# Patient Record
Sex: MALE | Race: WHITE | Employment: OTHER | URBAN - METROPOLITAN AREA
[De-identification: names, ages, dates, MRNs, and addresses within clinical notes are randomized per-mention and may not be internally consistent; named-entity substitution may affect disease eponyms.]

---

## 2022-01-18 ENCOUNTER — MEDICAL CORRESPONDENCE (OUTPATIENT)
Dept: HEALTH INFORMATION MANAGEMENT | Facility: CLINIC | Age: 52
End: 2022-01-18
Payer: COMMERCIAL

## 2022-01-19 ENCOUNTER — TRANSCRIBE ORDERS (OUTPATIENT)
Dept: OTHER | Age: 52
End: 2022-01-19

## 2022-01-19 DIAGNOSIS — Q01.9 ENCEPHALOCELE (H): Primary | ICD-10-CM

## 2022-01-19 DIAGNOSIS — H91.90 HEARING LOSS: Primary | ICD-10-CM

## 2022-01-19 DIAGNOSIS — Q01.9 ENCEPHALOCELE (H): ICD-10-CM

## 2022-01-19 NOTE — TELEPHONE ENCOUNTER
FUTURE VISIT INFORMATION      FUTURE VISIT INFORMATION:    Date: 3/2/22    Time: 10AM    Location: [facility] [place]  REFERRAL INFORMATION:    Referring provider:  Lory De La Paz PA      Referring providers clinic:  ENT [facility]      Reason for visit/diagnosis  WIN prior, Hearing loss per Pt, Ref by Nahomi Zaragoza MD in [facility]    RECORDS REQUESTED FROM:       Clinic name Comments Records Status Imaging Status   ENT [facility]   9/7/21 note from Yasir Dunaway MD    7/16/21 LEFT TYMPANOMASTOIDECTOMY  9/18/2020 LEFT TYMPANOMASTOIDECTOMY   Care everywhere     Audiology    [facility]       9/7/21 audiogram   01/05/2021 Audiogram  11/24/2020 audiogram    3/5/2020 audiogram  Scanned in Care everywhere encounter     [facility]  12/21/21 CT Temporal bone   3/11/21 CT Temporal bone   3/11/2020 CT Temporal bone  Care everywhere  PACS

## 2022-01-21 ENCOUNTER — MYC MEDICAL ADVICE (OUTPATIENT)
Dept: OTOLARYNGOLOGY | Facility: CLINIC | Age: 52
End: 2022-01-21
Payer: COMMERCIAL

## 2022-01-24 DIAGNOSIS — F40.240 CLAUSTROPHOBIA: Primary | ICD-10-CM

## 2022-01-24 RX ORDER — DIAZEPAM 2 MG
2-4 TABLET ORAL
Qty: 2 TABLET | Refills: 0 | Status: SHIPPED | OUTPATIENT
Start: 2022-01-24

## 2022-01-24 NOTE — TELEPHONE ENCOUNTER
Spoke with pt about MRI and his claustrophobia. Recommendations for valium and open-MRI. He states he has done these in the past and still doesn't love the experience, but is willing to try it once more. Reviewed needing a  after this appointment, and taking the valium once he has arrived at the center. Writer will send MRI order to [facility] [place], then they will reach out to him to schedule. Writer to update MD for valium script. Pt agrees with plan and no further questions.

## 2022-01-25 NOTE — ADDENDUM NOTE
Addended by: FARZAD URIAS on: 1/25/2022 05:30 PM     Modules accepted: Orders     Orthopaedic Progress Note      SUBJECTIVE   Ms. Jessica Mejia is post op day # 3     Patient s/p left total hip arthroplasty. Patient seen resting comfortably in bed eating her lunch. States pain is improving. No new concerns. Hoping to go home today with her daughter       OBJECTIVE      Physical    VITALS:  BP (!) 125/96   Pulse 101   Temp 99.1 °F (37.3 °C) (Oral)   Resp 16   Ht 5' 2\" (1.575 m)   Wt 130 lb 8.2 oz (59.2 kg)   SpO2 94%   BMI 23.87 kg/m²   I/O last 3 completed shifts: In: 2333.6 [P.O.:690; I.V.:1643.6]  Out: 2750 [Urine:2750]      LLE:  Prinio dressing in place with no signs of erythema, infections, or drainage noted. ROM knee, ankle, toes intact without difficulty or pain. Calf soft nontender to palpation. NVI, no numbness/tingling. Intact dorsalis pedal pulse and posterior tibialis pulse.           Data  CBC:   Lab Results   Component Value Date    WBC 11.0 06/23/2019    HGB 8.5 06/23/2019     06/23/2019     BMP:    Lab Results   Component Value Date     06/23/2019    K 4.3 06/23/2019     06/23/2019    CO2 24 06/23/2019    BUN 13 06/23/2019    CREATININE 0.7 06/23/2019    CALCIUM 8.9 06/23/2019    GLUCOSE 108 06/23/2019     Uric Acid:  No components found for: URIC  PT/INR:  No results found for: PROTIME, INR  Troponin:  No results found for: TROPONINI  Urine Culture:  No components found for: CURINE      Current Inpatient Medications    Current Facility-Administered Medications: 0.9 % sodium chloride infusion, , Intravenous, Continuous  morphine (PF) injection 2 mg, 2 mg, Intravenous, Q2H PRN **OR** morphine (PF) injection 4 mg, 4 mg, Intravenous, Q2H PRN  HYDROcodone-acetaminophen (NORCO) 5-325 MG per tablet 1 tablet, 1 tablet, Oral, Q4H PRN **OR** HYDROcodone-acetaminophen (NORCO) 5-325 MG per tablet 2 tablet, 2 tablet, Oral, Q4H PRN  cyclobenzaprine (FLEXERIL) tablet 10 mg, 10 mg, Oral, TID PRN  ondansetron (ZOFRAN) injection 4 mg, 4 mg, Intravenous, Q6H PRN  rivaroxaban (XARELTO) tablet 10 mg, 10 mg, Oral, Daily  docusate sodium (COLACE) capsule 100 mg, 100 mg, Oral, Daily  magnesium hydroxide (MILK OF MAGNESIA) 400 MG/5ML suspension 30 mL, 30 mL, Oral, Daily PRN  acetaminophen (TYLENOL) tablet 650 mg, 650 mg, Oral, Q4H PRN  amLODIPine (NORVASC) tablet 5 mg, 5 mg, Oral, Daily  atorvastatin (LIPITOR) tablet 80 mg, 80 mg, Oral, Nightly  carvedilol (COREG) tablet 6.25 mg, 6.25 mg, Oral, BID WC  levothyroxine (SYNTHROID) tablet 75 mcg, 75 mcg, Oral, Daily  linaCLOtide (LINZESS) capsule 72 mcg, 72 mcg, Oral, QAM AC  losartan (COZAAR) tablet 50 mg, 50 mg, Oral, BID  omega-3 acid ethyl esters (LOVAZA) capsule 1,000 mg, 1,000 mg, Oral, Daily  oxybutynin (DITROPAN-XL) extended release tablet 15 mg, 15 mg, Oral, Daily  pantoprazole (PROTONIX) tablet 40 mg, 40 mg, Oral, BID  PARoxetine (PAXIL) tablet 20 mg, 20 mg, Oral, QAM  polyethylene glycol (GLYCOLAX) packet 17 g, 17 g, Oral, Daily PRN  predniSONE (DELTASONE) tablet 5 mg, 5 mg, Oral, BID  rOPINIRole (REQUIP) tablet 2 mg, 2 mg, Oral, Nightly  traZODone (DESYREL) tablet 200 mg, 200 mg, Oral, Nightly        PLAN    1)  Cont with current medical management  2)  WBAT LLE, activity as tolerated  3)  Dry dressing changes PRN  4)  Plan on discharge to home today, f/u in office in 8 weeks    Rick Rodriguez PA-C

## 2022-02-06 ENCOUNTER — HEALTH MAINTENANCE LETTER (OUTPATIENT)
Age: 52
End: 2022-02-06

## 2022-02-09 NOTE — PROGRESS NOTES
Jam Luu is seen in consultation in the [facility] lateral skullbase clinic from Dr. Dunaway of [facility].  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 The patient underwent left tympanomastoidectomy by Dr. Yasir Dunaway 1.5 years ago (09/18/2020) and again a left tympanomastoidectomy 7 months ago (07/16/2021) for cholesteatoma which was noted at each surgery. At the last surgery, an encephalocele was encountered and the tegmen defect closed with cartilage. However, postoperative imaging showed a likely continued encephalocele.     Today, the patient reports no clear drainage from his nose and no drainage from the ear. No vertigo. No otalgia. Hearing is down on the left.    PMHx: Morbid obesity, asthma, depression    PSHx: Otologic history as above    Social History     Tobacco Use     Smoking status: Never Smoker     Smokeless tobacco: Current User   Substance Use Topics     Alcohol use: Not Currently     Drug use: None     Patient Supplied Answers to Review of Systems   ENT ROS 2/10/2022   Neurology Headache   Ears, Nose, Throat Hearing loss, Ringing/noise in ears, Nasal congestion or drainage   Cardiopulmonary Cough, Breathing problems, Wheezing   The remainder of the 10 point review of systems is otherwise negative.    Physical examination:  Constitutional:  In no acute distress, appears stated age  Eyes:  Extraocular movements intact, no spontaneous nystagmus  Ears:  Both ears examined under the microscope. Right ear canal clear, TM intact with an aerated middle ear. Left ear canal moist but without overt otorrhea, t-tube in position without otorrhea, cartilage graft noted, TM somewhat thickened.  Respiratory:  No increased work of breathing, wheezing or stridor  Musculoskeletal:  Good upper extremity strength  Skin:  No rashes on the head and neck  Neurologic:  House Brackman 1/6 bilaterally, ambulating normally  Psychiatric:  Alert, normal affect, answering questions  appropriately    Audiogram:  Audiogram was independently reviewed and compared to prior outside audiograms.  This demonstrated: right mild downsloping to severe sensorineural hearing loss and left moderate/severe upsloping to moderate downsloping to evere mixed loss with a mild sensorineural component and a notch at 2Khz down to 50dB.      Right: Speech reception threshold is 30 dB with 10% word recognition at 70 dB. Tympanogram C type. Absent thresholds.  Left: Speech reception threshold is 40 dB with 100% word recognition at 85 dB. Tympanogram C type. Absent thresholds.    Air thresholds bilaterally are similar to September 2021 but bone and speech discrimination could not be tested as the patient was falling asleep during testing.  January 2021 testing showed thresholds similar to today with excellent speech discrimination bilaterally.    Imaging:  Imaging was independently reviewed by myself. Right middle ear aerated, mastoid air cells opacified, ossicular chain intact, otic capsule intact, facial nerve in its usual position. Left mastoid cavity fully opacified with a large tegmen mastoideum defect, likely cartilage graft identified in the mastoid, middle ear partially opacified with tissue protruding through the antrum, PORP visualized, t-tube visualized, anterior middle ear aerated, otic capsule intact, facial nerve in its usual position.  CT TEMPORAL BONES WITHOUT CONTRAST (12/22/2021)  IMPRESSION:  1. Redemonstrated left wall up mastoidectomy with partial ossicular replacement prosthesis. Interval placement of a tympanostomy tube with improved aeration of the middle ear cavity. Persistent opacification of the mastoidectomy cavity and epitympanum with unchanged erosion of the mastoid tegmen. This could represent cholesteatoma versus nonspecific postsurgical/inflammatory change.  2. Unchanged right temporal bone CT with scattered opacification of peripheral mastoid air cells.      Outside records:  Multiple  records were reviewed by myself from [facility] and summarized above.     Assessment and plan:   Jam Luu is seen in consultation in the [facility] lateral skullbase clinic from Dr. Dunaway. 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 Recommendation was made for a combined transmastoid and middle fossa approach to repair the encephalocele and tegmen defect as well as to re-explore the middle ear for cholesteatoma. Risks and benefits of a middle fossa approach were discussed. Risks include but are not limited to: CSF leak requiring further surgery, seizures, strokes, bleeding and infection. The risks and benefits of revision tympanomastoidectomy were discussed. The risks include but are not limited to:  Worsened hearing which may require further surgery, profound and irreversible hearing loss, dizziness, damage to the taste nerve, damage to the facial nerve, tympanic membrane perforation requiring further surgery and infection. The current PORP will likely be removed and, if there is continued residual cholesteatoma, not replaced as he would need another revision surgery. We will also remove the t-tube although he will likely need another placed in the future. Postoperative restrictions were discussed, particularly for the middle fossa portion of the procedure. He had his questions answered and is agreeable to the plan.    Scribe Preparation Attestation:  I, Radha Lee, a scribe, prepared the chart for today's encounter.      The documentation recorded by the scribe accurately reflects the services I personally performed and the decisions made by me.

## 2022-02-10 ENCOUNTER — OFFICE VISIT (OUTPATIENT)
Dept: AUDIOLOGY | Facility: CLINIC | Age: 52
End: 2022-02-10
Attending: OTOLARYNGOLOGY
Payer: COMMERCIAL

## 2022-02-10 ENCOUNTER — OFFICE VISIT (OUTPATIENT)
Dept: OTOLARYNGOLOGY | Facility: CLINIC | Age: 52
End: 2022-02-10
Payer: COMMERCIAL

## 2022-02-10 VITALS
HEIGHT: 67 IN | DIASTOLIC BLOOD PRESSURE: 85 MMHG | BODY MASS INDEX: 49.44 KG/M2 | WEIGHT: 315 LBS | RESPIRATION RATE: 22 BRPM | SYSTOLIC BLOOD PRESSURE: 150 MMHG | TEMPERATURE: 98.8 F | HEART RATE: 86 BPM | OXYGEN SATURATION: 96 %

## 2022-02-10 DIAGNOSIS — H90.A32 MIXED CONDUCTIVE AND SENSORINEURAL HEARING LOSS OF LEFT EAR WITH RESTRICTED HEARING OF RIGHT EAR: ICD-10-CM

## 2022-02-10 DIAGNOSIS — Q01.9 ENCEPHALOCELE (H): Primary | ICD-10-CM

## 2022-02-10 DIAGNOSIS — H71.92 CHOLESTEATOMA OF LEFT EAR: ICD-10-CM

## 2022-02-10 DIAGNOSIS — H90.A21 SENSORINEURAL HEARING LOSS (SNHL) OF RIGHT EAR WITH RESTRICTED HEARING OF LEFT EAR: Primary | ICD-10-CM

## 2022-02-10 DIAGNOSIS — E66.01 MORBID OBESITY (H): ICD-10-CM

## 2022-02-10 DIAGNOSIS — H91.90 HEARING LOSS: ICD-10-CM

## 2022-02-10 PROCEDURE — 92557 COMPREHENSIVE HEARING TEST: CPT | Performed by: AUDIOLOGIST

## 2022-02-10 PROCEDURE — 99204 OFFICE O/P NEW MOD 45 MIN: CPT | Mod: 25 | Performed by: OTOLARYNGOLOGY

## 2022-02-10 PROCEDURE — 92565 STENGER TEST PURE TONE: CPT | Performed by: AUDIOLOGIST

## 2022-02-10 PROCEDURE — 92550 TYMPANOMETRY & REFLEX THRESH: CPT | Performed by: AUDIOLOGIST

## 2022-02-10 PROCEDURE — 92504 EAR MICROSCOPY EXAMINATION: CPT | Performed by: OTOLARYNGOLOGY

## 2022-02-10 RX ORDER — ALBUTEROL SULFATE 90 UG/1
AEROSOL, METERED RESPIRATORY (INHALATION)
COMMUNITY
Start: 2022-02-07

## 2022-02-10 RX ORDER — TAMSULOSIN HYDROCHLORIDE 0.4 MG/1
CAPSULE ORAL
COMMUNITY
Start: 2021-12-23

## 2022-02-10 ASSESSMENT — MIFFLIN-ST. JEOR: SCORE: 2351.79

## 2022-02-10 ASSESSMENT — PAIN SCALES - GENERAL: PAINLEVEL: SEVERE PAIN (6)

## 2022-02-10 NOTE — LETTER
2/10/2022       RE: Jam Luu  [address] [place] [address]     Dear Colleague,    Thank you for referring your patient, Jam Luu, to the [facility] [place] at [facility]. Please see a copy of my visit note below.    Jam Luu is seen in consultation in the [facility] lateral skullbase clinic from Dr. Dunaway of [facility].  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 The patient underwent left tympanomastoidectomy by Dr. Yasir Dunaway 1.5 years ago (09/18/2020) and again a left tympanomastoidectomy 7 months ago (07/16/2021) for cholesteatoma which was noted at each surgery. At the last surgery, an encephalocele was encountered and the tegmen defect closed with cartilage. However, postoperative imaging showed a likely continued encephalocele.     Today, the patient reports no clear drainage from his nose and no drainage from the ear. No vertigo. No otalgia. Hearing is down on the left.    PMHx: Morbid obesity, asthma, depression    PSHx: Otologic history as above    Social History     Tobacco Use     Smoking status: Never Smoker     Smokeless tobacco: Current User   Substance Use Topics     Alcohol use: Not Currently     Drug use: None     Patient Supplied Answers to Review of Systems   ENT ROS 2/10/2022   Neurology Headache   Ears, Nose, Throat Hearing loss, Ringing/noise in ears, Nasal congestion or drainage   Cardiopulmonary Cough, Breathing problems, Wheezing   The remainder of the 10 point review of systems is otherwise negative.    Physical examination:  Constitutional:  In no acute distress, appears stated age  Eyes:  Extraocular movements intact, no spontaneous nystagmus  Ears:  Both ears examined under the microscope. Right ear canal clear, TM intact with an aerated middle ear. Left ear canal moist but without overt otorrhea, t-tube in position without otorrhea,  cartilage graft noted, TM somewhat thickened.  Respiratory:  No increased work of breathing, wheezing or stridor  Musculoskeletal:  Good upper extremity strength  Skin:  No rashes on the head and neck  Neurologic:  House Brackman 1/6 bilaterally, ambulating normally  Psychiatric:  Alert, normal affect, answering questions appropriately    Audiogram:  Audiogram was independently reviewed and compared to prior outside audiograms.  This demonstrated: right mild downsloping to severe sensorineural hearing loss and left moderate/severe upsloping to moderate downsloping to evere mixed loss with a mild sensorineural component and a notch at 2Khz down to 50dB.      Right: Speech reception threshold is 30 dB with 10% word recognition at 70 dB. Tympanogram C type. Absent thresholds.  Left: Speech reception threshold is 40 dB with 100% word recognition at 85 dB. Tympanogram C type. Absent thresholds.    Air thresholds bilaterally are similar to September 2021 but bone and speech discrimination could not be tested as the patient was falling asleep during testing.  January 2021 testing showed thresholds similar to today with excellent speech discrimination bilaterally.    Imaging:  Imaging was independently reviewed by myself. Right middle ear aerated, mastoid air cells opacified, ossicular chain intact, otic capsule intact, facial nerve in its usual position. Left mastoid cavity fully opacified with a large tegmen mastoideum defect, likely cartilage graft identified in the mastoid, middle ear partially opacified with tissue protruding through the antrum, PORP visualized, t-tube visualized, anterior middle ear aerated, otic capsule intact, facial nerve in its usual position.  CT TEMPORAL BONES WITHOUT CONTRAST (12/22/2021)  IMPRESSION:  1. Redemonstrated left wall up mastoidectomy with partial ossicular replacement prosthesis. Interval placement of a tympanostomy tube with improved aeration of the middle ear cavity. Persistent  opacification of the mastoidectomy cavity and epitympanum with unchanged erosion of the mastoid tegmen. This could represent cholesteatoma versus nonspecific postsurgical/inflammatory change.  2. Unchanged right temporal bone CT with scattered opacification of peripheral mastoid air cells.      Outside records:  Multiple records were reviewed by myself from [facility] and summarized above.     Assessment and plan:   Jam Luu is seen in consultation in the [facility] lateral skullbase clinic from Dr. Dunaway. 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 Recommendation was made for a combined transmastoid and middle fossa approach to repair the encephalocele and tegmen defect as well as to re-explore the middle ear for cholesteatoma. Risks and benefits of a middle fossa approach were discussed. Risks include but are not limited to: CSF leak requiring further surgery, seizures, strokes, bleeding and infection. The risks and benefits of revision tympanomastoidectomy were discussed. The risks include but are not limited to:  Worsened hearing which may require further surgery, profound and irreversible hearing loss, dizziness, damage to the taste nerve, damage to the facial nerve, tympanic membrane perforation requiring further surgery and infection. The current PORP will likely be removed and, if there is continued residual cholesteatoma, not replaced as he would need another revision surgery. We will also remove the t-tube although he will likely need another placed in the future. Postoperative restrictions were discussed, particularly for the middle fossa portion of the procedure. He had his questions answered and is agreeable to the plan.    Scribe Preparation Attestation:  Radha STORY, a scribe, prepared the chart for today's encounter.      The documentation recorded by the scribe accurately reflects the services I  personally performed and the decisions made by me.      Nahomi Zaragoza MD

## 2022-02-10 NOTE — NURSING NOTE
"Chief Complaint   Patient presents with     Consult     hearing loss      Blood pressure (!) 161/141, pulse 86, temperature 98.8  F (37.1  C), resp. rate 22, height 1.702 m (5' 7\"), weight (!) 153.3 kg (338 lb), SpO2 96 %.    Mark Lorenzo LPN    "

## 2022-02-10 NOTE — PROGRESS NOTES
AUDIOLOGY REPORT    SUMMARY: Audiology visit completed. See audiogram for results.      RECOMMENDATIONS: Follow-up with ENT.      Jd Steele.  Licensed Audiologist  [place] #[id number]

## 2022-02-22 PROBLEM — E66.01 MORBID OBESITY (H): Status: ACTIVE | Noted: 2022-02-22

## 2022-02-22 RX ORDER — CEFAZOLIN SODIUM IN 0.9 % NACL 3 G/100 ML
3 INTRAVENOUS SOLUTION, PIGGYBACK (ML) INTRAVENOUS SEE ADMIN INSTRUCTIONS
Status: CANCELLED | OUTPATIENT
Start: 2022-02-22

## 2022-02-22 RX ORDER — ACETAMINOPHEN 325 MG/1
975 TABLET ORAL ONCE
Status: CANCELLED | OUTPATIENT
Start: 2022-02-22 | End: 2022-02-22

## 2022-02-22 RX ORDER — DEXAMETHASONE SODIUM PHOSPHATE 10 MG/ML
10 INJECTION, SOLUTION INTRAMUSCULAR; INTRAVENOUS ONCE
Status: CANCELLED | OUTPATIENT
Start: 2022-02-22 | End: 2022-02-22

## 2022-02-22 RX ORDER — CEFAZOLIN SODIUM IN 0.9 % NACL 3 G/100 ML
3 INTRAVENOUS SOLUTION, PIGGYBACK (ML) INTRAVENOUS
Status: CANCELLED | OUTPATIENT
Start: 2022-02-22

## 2022-03-02 ENCOUNTER — PRE VISIT (OUTPATIENT)
Dept: OTOLARYNGOLOGY | Facility: CLINIC | Age: 52
End: 2022-03-02

## 2022-03-10 ENCOUNTER — HOSPITAL ENCOUNTER (INPATIENT)
Facility: CLINIC | Age: 52
Setting detail: SURGERY ADMIT
End: 2022-03-10
Attending: OTOLARYNGOLOGY | Admitting: OTOLARYNGOLOGY
Payer: COMMERCIAL

## 2022-03-10 DIAGNOSIS — Z11.59 ENCOUNTER FOR SCREENING FOR OTHER VIRAL DISEASES: Primary | ICD-10-CM

## 2022-03-22 ENCOUNTER — TELEPHONE (OUTPATIENT)
Dept: NEUROSURGERY | Facility: CLINIC | Age: 52
End: 2022-03-22
Payer: COMMERCIAL

## 2022-03-22 NOTE — TELEPHONE ENCOUNTER
Connected with Jam via telephone to discuss pre-surgery planning. Patient reports he has been working with our financial team to figure out insurance issues, as [facility] is technically out of network for him. He is working on contacting his referring provider to see if they can assist him in making a request for an out-of-network exception.     Due to insurance issues, patient would prefer to complete H&P with his PCP. He knows to set this up within 30 days of surgery. He is also requesting to [place] [facility] Writer faxed this to f# [phone] today. Informed patient he will need to schedule this appointment within 4 days of surgery, and he verbalized understanding.    [employer] App message sent to patient, per his request, with contact information for future questions.    Amber Bauer, RN  RN Care Coordinator, Skull Base Surgery  Direct: [phone]

## 2022-04-04 ENCOUNTER — TELEPHONE (OUTPATIENT)
Dept: NEUROSURGERY | Facility: CLINIC | Age: 52
End: 2022-04-04
Payer: COMMERCIAL

## 2022-04-05 NOTE — TELEPHONE ENCOUNTER
Patient called stating he needs to cancel surgery on Monday. He was unable to get an out-of-network exception through his insurance company, and he states they are requiring him to see [facility] provider for this. Patient needed to end call abruptly due to another incoming call that he needed to take. Dr. Zaragoza notified.

## 2022-06-13 ENCOUNTER — HOSPITAL ENCOUNTER (OUTPATIENT)
Facility: CLINIC | Age: 52
End: 2022-06-13
Attending: INTERNAL MEDICINE | Admitting: INTERNAL MEDICINE
Payer: COMMERCIAL

## 2022-10-02 ENCOUNTER — HEALTH MAINTENANCE LETTER (OUTPATIENT)
Age: 52
End: 2022-10-02

## 2023-02-11 ENCOUNTER — HEALTH MAINTENANCE LETTER (OUTPATIENT)
Age: 53
End: 2023-02-11

## 2024-03-09 ENCOUNTER — HEALTH MAINTENANCE LETTER (OUTPATIENT)
Age: 54
End: 2024-03-09